# Patient Record
Sex: FEMALE | Race: WHITE | URBAN - METROPOLITAN AREA
[De-identification: names, ages, dates, MRNs, and addresses within clinical notes are randomized per-mention and may not be internally consistent; named-entity substitution may affect disease eponyms.]

---

## 2019-06-26 ENCOUNTER — NEW PATIENT (OUTPATIENT)
Dept: URBAN - METROPOLITAN AREA CLINIC 64 | Facility: CLINIC | Age: 54
End: 2019-06-26
Payer: COMMERCIAL

## 2019-06-26 DIAGNOSIS — H52.13 MYOPIA, BILATERAL: Primary | ICD-10-CM

## 2019-06-26 PROCEDURE — 92310 CONTACT LENS FITTING OU: CPT | Performed by: OPTOMETRIST

## 2019-06-26 PROCEDURE — 92004 COMPRE OPH EXAM NEW PT 1/>: CPT | Performed by: OPTOMETRIST

## 2019-06-26 PROCEDURE — 92015 DETERMINE REFRACTIVE STATE: CPT | Performed by: OPTOMETRIST

## 2019-06-26 ASSESSMENT — KERATOMETRY
OS: 44.55
OD: 45.23

## 2019-06-26 ASSESSMENT — INTRAOCULAR PRESSURE
OS: 19
OD: 18

## 2019-06-26 ASSESSMENT — VISUAL ACUITY
OS: 20/20
OD: 20/20

## 2021-05-21 ENCOUNTER — OFFICE VISIT (OUTPATIENT)
Dept: URBAN - METROPOLITAN AREA CLINIC 64 | Facility: CLINIC | Age: 56
End: 2021-05-21
Payer: COMMERCIAL

## 2021-05-21 PROCEDURE — 92014 COMPRE OPH EXAM EST PT 1/>: CPT | Performed by: OPTOMETRIST

## 2021-05-21 ASSESSMENT — KERATOMETRY
OD: 44.49
OS: 44.27

## 2021-05-21 ASSESSMENT — VISUAL ACUITY
OD: 20/20
OS: 20/20

## 2021-05-21 ASSESSMENT — INTRAOCULAR PRESSURE
OS: 17
OD: 19

## 2021-05-21 NOTE — IMPRESSION/PLAN
Impression: Myopia, bilateral Plan: Updated glasses Rx. 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 Discussed SV computer glasses. She would like to try the PALs first.  She is no longer wearing CLs due to long hours of computer work.